# Patient Record
Sex: FEMALE | Race: WHITE | NOT HISPANIC OR LATINO | URBAN - METROPOLITAN AREA
[De-identification: names, ages, dates, MRNs, and addresses within clinical notes are randomized per-mention and may not be internally consistent; named-entity substitution may affect disease eponyms.]

---

## 2018-08-29 ENCOUNTER — EMERGENCY (EMERGENCY)
Facility: HOSPITAL | Age: 28
LOS: 0 days | Discharge: HOME | End: 2018-08-29
Attending: EMERGENCY MEDICINE | Admitting: EMERGENCY MEDICINE
Payer: MEDICAID

## 2018-08-29 VITALS
SYSTOLIC BLOOD PRESSURE: 116 MMHG | HEIGHT: 62 IN | RESPIRATION RATE: 16 BRPM | WEIGHT: 139.99 LBS | OXYGEN SATURATION: 100 % | DIASTOLIC BLOOD PRESSURE: 64 MMHG | HEART RATE: 74 BPM | TEMPERATURE: 98 F

## 2018-08-29 DIAGNOSIS — Z71.1 PERSON WITH FEARED HEALTH COMPLAINT IN WHOM NO DIAGNOSIS IS MADE: ICD-10-CM

## 2018-08-29 PROCEDURE — 99282 EMERGENCY DEPT VISIT SF MDM: CPT
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
## 2018-08-29 NOTE — ED STATDOCS - MEDICAL DECISION MAKING DETAILS
No injuries on exam.  No pain elicited to thorax, abdomen, back, head, neck, or extremities.  Okay for d/c home.

## 2018-09-01 ENCOUNTER — OUTPATIENT (OUTPATIENT)
Dept: OUTPATIENT SERVICES | Facility: HOSPITAL | Age: 28
LOS: 1 days | End: 2018-09-01
Payer: MEDICAID

## 2018-09-01 PROCEDURE — G9001: CPT

## 2018-09-10 DIAGNOSIS — Z71.89 OTHER SPECIFIED COUNSELING: ICD-10-CM

## 2018-10-10 ENCOUNTER — EMERGENCY (EMERGENCY)
Facility: HOSPITAL | Age: 28
LOS: 0 days | Discharge: HOME | End: 2018-10-11
Attending: EMERGENCY MEDICINE | Admitting: EMERGENCY MEDICINE
Payer: MEDICAID

## 2018-10-10 VITALS
HEART RATE: 71 BPM | RESPIRATION RATE: 18 BRPM | SYSTOLIC BLOOD PRESSURE: 105 MMHG | TEMPERATURE: 98 F | WEIGHT: 139.99 LBS | HEIGHT: 62 IN | OXYGEN SATURATION: 100 % | DIASTOLIC BLOOD PRESSURE: 71 MMHG
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
## 2018-10-10 DIAGNOSIS — M25.532 PAIN IN LEFT WRIST: ICD-10-CM

## 2018-10-10 DIAGNOSIS — S52.502A UNSPECIFIED FRACTURE OF THE LOWER END OF LEFT RADIUS, INITIAL ENCOUNTER FOR CLOSED FRACTURE: ICD-10-CM
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
## 2018-10-10 PROCEDURE — 73130 X-RAY EXAM OF HAND: CPT | Mod: 26,LT

## 2018-10-10 PROCEDURE — 99283 EMERGENCY DEPT VISIT LOW MDM: CPT | Mod: 25

## 2018-10-10 PROCEDURE — 73110 X-RAY EXAM OF WRIST: CPT | Mod: 26,LT

## 2018-10-10 PROCEDURE — 29125 APPL SHORT ARM SPLINT STATIC: CPT | Mod: LT

## 2018-10-10 RX ORDER — IBUPROFEN 200 MG
600 TABLET ORAL ONCE
Qty: 0 | Refills: 0 | Status: COMPLETED | OUTPATIENT
Start: 2018-10-10 | End: 2018-10-10

## 2018-10-10 NOTE — ED ADULT NURSE NOTE - NSIMPLEMENTINTERV_GEN_ALL_ED
Implemented All Fall Risk Interventions:  [place] to call system. Call bell, personal items and telephone within reach. Instruct patient to call for assistance. Room bathroom lighting operational. Non-slip footwear when patient is off stretcher. Physically safe environment: no spills, clutter or unnecessary equipment. Stretcher in lowest position, wheels locked, appropriate side rails in place. Provide visual cue, wrist band, yellow gown, etc. Monitor gait and stability. Monitor for mental status changes and reorient to person, place, and time. Review medications for side effects contributing to fall risk. Reinforce activity limits and safety measures with patient and family.
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
## 2018-10-10 NOTE — ED ADULT NURSE NOTE - OBJECTIVE STATEMENT
Patient presents to ED complaining of Left wrist pain. Patient sattes she fell from standing height earlier today and landed on her left arm. Patient sattes she has been experiencing left wrist pain, increased with movement. Patient denies hitting head, weakness, dizziness. Patient has no PMHx, A&0x3.

## 2018-10-10 NOTE — ED PROVIDER NOTE - MEDICAL DECISION MAKING DETAILS
Splint applied in ED.  Pt will follow up with Dr. Elizabeth in 1 week.  Pt given activity precautions.

## 2018-10-11 RX ADMIN — Medication 600 MILLIGRAM(S): at 00:39

## 2018-10-11 NOTE — ED PROCEDURE NOTE - NS ED PERI VASCULAR NEG
no swelling/no paresthesia/fingers/toes warm to touch/no cyanosis of extremity/capillary refill time < 2 seconds

## 2018-10-28 NOTE — ED ADULT NURSE NOTE - NSSISCREENINGQ5_ED_A_ED
No Implemented All Universal Safety Interventions:  [place] to call system. Call bell, personal items and telephone within reach. Instruct patient to call for assistance. Room bathroom lighting operational. Non-slip footwear when patient is off stretcher. Physically safe environment: no spills, clutter or unnecessary equipment. Stretcher in lowest position, wheels locked, appropriate side rails in place.

## 2022-09-16 NOTE — ED STATDOCS - GASTROINTESTINAL, MLM
alert,  in no acute distress,  well developed, well nourished abdomen soft, non-tender, and non-distended. Bowel sounds present.

## 2025-02-01 NOTE — ED STATDOCS - CHPI ED SYMPTOM NEG
Ambulatory Care Coordination Note     1/31/2025 1:44 PM     Patient outreach attempt by this AC today to offer care management services. ACM was unable to reach the patient by telephone today;   left voice message requesting a return phone call to this ACM.  [facility] requesting [facility] to contact this [facility].     ACM: Rona Augustin RN     Care Summary Note: first attempt to reach patient for enrollment     PCP/Specialist follow up:   Future Appointments         Provider Specialty Dept Phone    2/3/2025 11:00 AM Kailey Pepe MD Family Medicine [phone]    2/5/2025 11:00 AM (Arrive by 10:45 AM) 1, [facility] Ir Nurse; [facility] IR RADIOLOGIST; [facility] CT RM 2 FAST SCANNER Radiology Special Procedures [phone]    2/13/2025 9:50 AM Jony Moss MD Urology [phone]    3/7/2025 11:45 AM Bhumika Alba MD Oncology [phone]    3/12/2025 3:00 PM Christy Lynn MD Physical Medicine and Rehab [phone]            Follow Up:   Plan for next AC outreach in approximately 1 week to complete:  - outreach attempt to offer care management services.            
Ambulatory Care Coordination Note     2025 2:00 PM     Patient Current Location:  Home: [address] [facility] [address]     This patient was received as a referral from Provider.    Patient contacted the ACM by telephone. Verified name and  with patient as identifiers. Provided introduction to self, and explanation of the ACM role.   Patient declined care management services at this time.          ACM: Rona Augustin RN     Challenges to be reviewed by the provider   Additional needs identified to be addressed with provider No  none               Method of communication with provider: none.    Utilization: Initial Call - N/A    Care Summary Note: Spoke to Rajani. Introduced self and reason for call. She denies any needs and declines services. She has transportation. No concerns with her medication costs. She has needed equipment at home. No concerns for food or utilities. She said she got some good news today- that her lab work was negative for cancer but they still needs to do a bone marrow aspiration. She is scheduled for that next Wednesday. On Monday she has her AWV. Encouraged her to let her provider know if she has any needs/concerns in the future.       PCP/Specialist follow up:   Future Appointments         Provider Specialty Dept Phone    2/3/2025 11:00 AM Kailey Pepe MD Family Medicine [phone]    2025 11:00 AM (Arrive by 10:45 AM) 1, [facility] Ir Nurse; [facility] IR RADIOLOGIST; [facility] CT RM 2 FAST SCANNER Radiology Special Procedures [phone]    2025 9:50 AM Jony Moss MD Urology [phone]    3/7/2025 11:45 AM Bhumika Alba MD Oncology [phone]    3/12/2025 3:00 PM Christy Lynn MD Physical Medicine and Rehab [phone]            Follow Up:   No further Ambulatory Care Management follow-up scheduled at this time.  Patient  has Ambulatory Care Manager's contact information for any further questions, concerns or needs.        
no fever/no palpitations